# Patient Record
Sex: FEMALE | Race: WHITE | NOT HISPANIC OR LATINO | Employment: FULL TIME | URBAN - NONMETROPOLITAN AREA
[De-identification: names, ages, dates, MRNs, and addresses within clinical notes are randomized per-mention and may not be internally consistent; named-entity substitution may affect disease eponyms.]

---

## 2023-03-22 PROBLEM — Z97.3 WEARS CONTACT LENSES: Status: ACTIVE | Noted: 2023-03-22

## 2023-03-22 PROBLEM — H52.7 REFRACTIVE ERROR: Status: ACTIVE | Noted: 2023-03-22

## 2023-03-22 PROBLEM — M25.572 LEFT ANKLE PAIN: Status: ACTIVE | Noted: 2023-03-22

## 2023-03-22 PROBLEM — Z97.3 WEARS GLASSES: Status: ACTIVE | Noted: 2023-03-22

## 2023-03-22 RX ORDER — NORETHINDRONE ACETATE AND ETHINYL ESTRADIOL .03; 1.5 MG/1; MG/1
TABLET ORAL
COMMUNITY
End: 2024-03-28 | Stop reason: ALTCHOICE

## 2023-03-28 ENCOUNTER — OFFICE VISIT (OUTPATIENT)
Dept: PRIMARY CARE | Facility: CLINIC | Age: 28
End: 2023-03-28
Payer: COMMERCIAL

## 2023-03-28 VITALS
WEIGHT: 151 LBS | BODY MASS INDEX: 24.27 KG/M2 | OXYGEN SATURATION: 98 % | RESPIRATION RATE: 18 BRPM | DIASTOLIC BLOOD PRESSURE: 79 MMHG | HEIGHT: 66 IN | SYSTOLIC BLOOD PRESSURE: 124 MMHG | HEART RATE: 52 BPM

## 2023-03-28 DIAGNOSIS — Z00.00 HEALTH MAINTENANCE EXAMINATION: ICD-10-CM

## 2023-03-28 PROBLEM — M25.572 ARTHRALGIA OF LEFT ANKLE: Status: RESOLVED | Noted: 2018-06-05 | Resolved: 2023-03-28

## 2023-03-28 PROBLEM — S86.019A RUPTURE OF ACHILLES TENDON: Status: RESOLVED | Noted: 2018-06-05 | Resolved: 2023-03-28

## 2023-03-28 PROCEDURE — 99203 OFFICE O/P NEW LOW 30 MIN: CPT | Performed by: INTERNAL MEDICINE

## 2023-03-28 PROCEDURE — 1036F TOBACCO NON-USER: CPT | Performed by: INTERNAL MEDICINE

## 2023-03-28 ASSESSMENT — PATIENT HEALTH QUESTIONNAIRE - PHQ9
1. LITTLE INTEREST OR PLEASURE IN DOING THINGS: NOT AT ALL
2. FEELING DOWN, DEPRESSED OR HOPELESS: NOT AT ALL
SUM OF ALL RESPONSES TO PHQ9 QUESTIONS 1 AND 2: 0

## 2023-03-28 ASSESSMENT — PAIN SCALES - GENERAL: PAINLEVEL: 0-NO PAIN

## 2023-03-28 NOTE — PROGRESS NOTES
Subjective   Patient ID:   Joana Morales is a 27 y.o. female with no significant PMH who presents for Establish Care.    HPI  - reports that her  came and saw us then refer'ed her.   - working at [employer] full time  - , no children yet    New patient here today to establish care with myself.    Health Maintenance:  Smoking: Non Smoker  ETOH: Occasional  Illicits: Denies  Pap smear (21-65): 1/19/2022  Labs: Due  -lifts weights and plays soccer    Review of Systems   All other systems reviewed and are negative.    12 point review of systems negative unless stated above in HPI    Vitals:    03/28/23 0934   BP: 124/79   Pulse: 52   Resp: 18   SpO2: 98%     No Known Allergies     Current Outpatient Medications on File Prior to Visit   Medication Sig Dispense Refill    norethindrone ac-eth estradioL (Junel 1.5/30, 21,) 1.5-30 mg-mcg tablet tablet Junel 1.5/30 TABS   Refills: 0       Active       No current facility-administered medications on file prior to visit.      Physical Exam  Vitals reviewed.   Constitutional:       General: She is not in acute distress.     Appearance: Normal appearance. She is not ill-appearing.   HENT:      Head: Normocephalic and atraumatic.      Right Ear: Tympanic membrane and external ear normal.      Left Ear: Tympanic membrane and external ear normal.      Nose: Nose normal.      Mouth/Throat:      Mouth: Mucous membranes are moist.      Pharynx: Oropharynx is clear.   Eyes:      Conjunctiva/sclera: Conjunctivae normal.      Pupils: Pupils are equal, round, and reactive to light.   Cardiovascular:      Rate and Rhythm: Normal rate and regular rhythm.      Heart sounds: Normal heart sounds. No murmur heard.  Pulmonary:      Effort: Pulmonary effort is normal. No respiratory distress.      Breath sounds: Normal breath sounds. No wheezing.   Abdominal:      General: There is no distension.      Palpations: Abdomen is soft. There is no mass.      Tenderness: There is no abdominal  tenderness.   Musculoskeletal:         General: Normal range of motion.      Cervical back: Normal range of motion and neck supple.   Skin:     General: Skin is warm and dry.   Neurological:      General: No focal deficit present.      Mental Status: She is alert and oriented to person, place, and time.      Sensory: No sensory deficit.      Motor: No weakness.      Coordination: Coordination normal.      Gait: Gait normal.   Psychiatric:         Mood and Affect: Mood normal.         Behavior: Behavior normal.       Assessment/Plan   Diagnoses and all orders for this visit:  Health maintenance examination  Comments:  - check labs  - advised to avoid fluoroquinolones d/t h/o achilles rupture  -------------------  Written by Margarita Vee LPN, acting as a scribe for Dr. Pollock. This note accurately reflects the work and decisions made by Dr. Pollock.     I, Dr. Pollock, attest all medical record entries made by the scribe were under my direction and were personally dictated by me. I have reviewed the chart and agree that the record accurately reflects my performance of the history, physical exam, and assessment and plan.

## 2023-05-19 ENCOUNTER — LAB (OUTPATIENT)
Dept: LAB | Facility: LAB | Age: 28
End: 2023-05-19
Payer: COMMERCIAL

## 2023-05-19 DIAGNOSIS — Z00.00 HEALTH MAINTENANCE EXAMINATION: ICD-10-CM

## 2023-05-19 LAB
ALANINE AMINOTRANSFERASE (SGPT) (U/L) IN SER/PLAS: 14 U/L (ref 7–45)
ALBUMIN (G/DL) IN SER/PLAS: 4.2 G/DL (ref 3.4–5)
ALKALINE PHOSPHATASE (U/L) IN SER/PLAS: 51 U/L (ref 33–110)
ANION GAP IN SER/PLAS: 12 MMOL/L (ref 10–20)
ASPARTATE AMINOTRANSFERASE (SGOT) (U/L) IN SER/PLAS: 22 U/L (ref 9–39)
BASOPHILS (10*3/UL) IN BLOOD BY AUTOMATED COUNT: 0.02 X10E9/L (ref 0–0.1)
BASOPHILS/100 LEUKOCYTES IN BLOOD BY AUTOMATED COUNT: 0.4 % (ref 0–2)
BILIRUBIN TOTAL (MG/DL) IN SER/PLAS: 0.6 MG/DL (ref 0–1.2)
CALCIUM (MG/DL) IN SER/PLAS: 9.3 MG/DL (ref 8.6–10.3)
CARBON DIOXIDE, TOTAL (MMOL/L) IN SER/PLAS: 28 MMOL/L (ref 21–32)
CHLORIDE (MMOL/L) IN SER/PLAS: 103 MMOL/L (ref 98–107)
CHOLESTEROL (MG/DL) IN SER/PLAS: 185 MG/DL (ref 0–199)
CHOLESTEROL IN HDL (MG/DL) IN SER/PLAS: 69.3 MG/DL
CHOLESTEROL/HDL RATIO: 2.7
CREATININE (MG/DL) IN SER/PLAS: 0.89 MG/DL (ref 0.5–1.05)
EOSINOPHILS (10*3/UL) IN BLOOD BY AUTOMATED COUNT: 0.02 X10E9/L (ref 0–0.7)
EOSINOPHILS/100 LEUKOCYTES IN BLOOD BY AUTOMATED COUNT: 0.4 % (ref 0–6)
ERYTHROCYTE DISTRIBUTION WIDTH (RATIO) BY AUTOMATED COUNT: 12.2 % (ref 11.5–14.5)
ERYTHROCYTE MEAN CORPUSCULAR HEMOGLOBIN CONCENTRATION (G/DL) BY AUTOMATED: 32.4 G/DL (ref 32–36)
ERYTHROCYTE MEAN CORPUSCULAR VOLUME (FL) BY AUTOMATED COUNT: 93 FL (ref 80–100)
ERYTHROCYTES (10*6/UL) IN BLOOD BY AUTOMATED COUNT: 4.57 X10E12/L (ref 4–5.2)
GFR FEMALE: >90 ML/MIN/1.73M2
GLUCOSE (MG/DL) IN SER/PLAS: 83 MG/DL (ref 74–99)
HEMATOCRIT (%) IN BLOOD BY AUTOMATED COUNT: 42.6 % (ref 36–46)
HEMOGLOBIN (G/DL) IN BLOOD: 13.8 G/DL (ref 12–16)
IMMATURE GRANULOCYTES/100 LEUKOCYTES IN BLOOD BY AUTOMATED COUNT: 0.4 % (ref 0–0.9)
LDL: 99 MG/DL (ref 0–99)
LEUKOCYTES (10*3/UL) IN BLOOD BY AUTOMATED COUNT: 5.5 X10E9/L (ref 4.4–11.3)
LYMPHOCYTES (10*3/UL) IN BLOOD BY AUTOMATED COUNT: 1.88 X10E9/L (ref 1.2–4.8)
LYMPHOCYTES/100 LEUKOCYTES IN BLOOD BY AUTOMATED COUNT: 33.9 % (ref 13–44)
MONOCYTES (10*3/UL) IN BLOOD BY AUTOMATED COUNT: 0.42 X10E9/L (ref 0.1–1)
MONOCYTES/100 LEUKOCYTES IN BLOOD BY AUTOMATED COUNT: 7.6 % (ref 2–10)
NEUTROPHILS (10*3/UL) IN BLOOD BY AUTOMATED COUNT: 3.18 X10E9/L (ref 1.2–7.7)
NEUTROPHILS/100 LEUKOCYTES IN BLOOD BY AUTOMATED COUNT: 57.3 % (ref 40–80)
PLATELETS (10*3/UL) IN BLOOD AUTOMATED COUNT: 228 X10E9/L (ref 150–450)
POTASSIUM (MMOL/L) IN SER/PLAS: 4.3 MMOL/L (ref 3.5–5.3)
PROTEIN TOTAL: 7.3 G/DL (ref 6.4–8.2)
SODIUM (MMOL/L) IN SER/PLAS: 139 MMOL/L (ref 136–145)
THYROTROPIN (MIU/L) IN SER/PLAS BY DETECTION LIMIT <= 0.05 MIU/L: 2.27 MIU/L (ref 0.44–3.98)
TRIGLYCERIDE (MG/DL) IN SER/PLAS: 83 MG/DL (ref 0–149)
UREA NITROGEN (MG/DL) IN SER/PLAS: 14 MG/DL (ref 6–23)
VLDL: 17 MG/DL (ref 0–40)

## 2023-05-19 PROCEDURE — 80061 LIPID PANEL: CPT

## 2023-05-19 PROCEDURE — 85025 COMPLETE CBC W/AUTO DIFF WBC: CPT

## 2023-05-19 PROCEDURE — 36415 COLL VENOUS BLD VENIPUNCTURE: CPT

## 2023-05-19 PROCEDURE — 80053 COMPREHEN METABOLIC PANEL: CPT

## 2023-05-19 PROCEDURE — 84443 ASSAY THYROID STIM HORMONE: CPT

## 2023-10-03 PROCEDURE — 87651 STREP A DNA AMP PROBE: CPT

## 2023-10-04 ENCOUNTER — LAB REQUISITION (OUTPATIENT)
Dept: LAB | Facility: HOSPITAL | Age: 28
End: 2023-10-04
Payer: COMMERCIAL

## 2023-10-04 LAB — S PYO DNA THROAT QL NAA+PROBE: NOT DETECTED

## 2024-02-19 ENCOUNTER — TELEPHONE (OUTPATIENT)
Dept: OBSTETRICS AND GYNECOLOGY | Facility: CLINIC | Age: 29
End: 2024-02-19
Payer: COMMERCIAL

## 2024-02-21 ENCOUNTER — TELEPHONE (OUTPATIENT)
Dept: OBSTETRICS AND GYNECOLOGY | Facility: CLINIC | Age: 29
End: 2024-02-21
Payer: COMMERCIAL

## 2024-03-28 ENCOUNTER — OFFICE VISIT (OUTPATIENT)
Dept: OBSTETRICS AND GYNECOLOGY | Facility: CLINIC | Age: 29
End: 2024-03-28
Payer: COMMERCIAL

## 2024-03-28 VITALS
HEIGHT: 66 IN | WEIGHT: 153 LBS | DIASTOLIC BLOOD PRESSURE: 64 MMHG | SYSTOLIC BLOOD PRESSURE: 114 MMHG | BODY MASS INDEX: 24.59 KG/M2

## 2024-03-28 DIAGNOSIS — Z31.69 PRE-CONCEPTION COUNSELING: ICD-10-CM

## 2024-03-28 DIAGNOSIS — Z01.419 WELL WOMAN EXAM: Primary | ICD-10-CM

## 2024-03-28 DIAGNOSIS — Z12.4 CERVICAL CANCER SCREENING: ICD-10-CM

## 2024-03-28 PROCEDURE — 99385 PREV VISIT NEW AGE 18-39: CPT | Performed by: OBSTETRICS & GYNECOLOGY

## 2024-03-28 PROCEDURE — 1036F TOBACCO NON-USER: CPT | Performed by: OBSTETRICS & GYNECOLOGY

## 2024-03-28 PROCEDURE — 88175 CYTOPATH C/V AUTO FLUID REDO: CPT

## 2024-03-28 ASSESSMENT — ENCOUNTER SYMPTOMS
MUSCULOSKELETAL NEGATIVE: 1
CARDIOVASCULAR NEGATIVE: 1
PSYCHIATRIC NEGATIVE: 1
RESPIRATORY NEGATIVE: 1
NEUROLOGICAL NEGATIVE: 1
CONSTITUTIONAL NEGATIVE: 1
GASTROINTESTINAL NEGATIVE: 1

## 2024-03-28 NOTE — PROGRESS NOTES
"Subjective   Joana Morales is a 28 y.o. female who is here for a routine exam. Periods are regular every 28-30 days, lasting 5 days. Dysmenorrhea:none. Cyclic symptoms include headache and pelvic pain. No intermenstrual bleeding, spotting, or discharge. Patient is currently sexually active. Denies dyspareunia.    Current contraception: none  History of abnormal Pap smear: yes - atypical cells  Family history of uterine or ovarian cancer: no  Regular self breast exam: yes  History of abnormal mammogram: no  Family history of breast cancer: yes - paternal aunt  History of abnormal lipids: no  Menstrual History:  OB History          0    Para   0    Term   0       0    AB   0    Living   0         SAB   0    IAB   0    Ectopic   0    Multiple   0    Live Births   0                Menarche age: 13  Patient's last menstrual period was 2024 (exact date).         Review of Systems   Constitutional: Negative.    Respiratory: Negative.     Cardiovascular: Negative.    Gastrointestinal: Negative.    Genitourinary: Negative.    Musculoskeletal: Negative.    Neurological: Negative.    Psychiatric/Behavioral: Negative.         Objective   /64   Ht 1.676 m (5' 6\")   Wt 69.4 kg (153 lb)   LMP 2024 (Exact Date) Comment: stopped bc after getting period in feb  BMI 24.69 kg/m²     General:   alert, appears stated age, and cooperative   Heart: regular rate and rhythm, S1, S2 normal, no murmur, click, rub or gallop   Lungs: clear to auscultation bilaterally   Abdomen: soft, non-tender, without masses or organomegaly   Pelvis:  Vulva normal in appearance without discoloration, rashes, lesions. Vagina is negative for blood, discharge, lesions. Uterus is small, mobile, non tender. There is no cervical motion tenderness, adnexal masses/tenderness.   Breast: No masses, skin changes, discoloration, tenderness, or nipple drainage bilaterally    Neck: Normal ROM. Thyroid normal size. No masses/tenderness   "   Lymph: No LAD   Psych: Normal mood/affect     Assessment/Plan   Problem List Items Addressed This Visit    None  Visit Diagnoses       Well woman exam    -  Primary    Pelvic and breast exam normal  Precautions given  RTO 1 year RA    Cervical cancer screening        Pap pending 3/28    Pre-conception counseling        Advised PNV and to call with first positive home UPT to schedule pregnancy confirmation visit         Denice Christian DO

## 2024-03-28 NOTE — PATIENT INSTRUCTIONS
Routine Gynecologic Visit:  You were seen today for a routine gyn visit with normal findings on exam  You were due for a pap smear today. You should still continue to get annual breast and pelvic exams in the office.  Continue self breast exams/monitoring at home and call for appointment in the office if there are ever masses, skin discoloration, dimpling/puckering of the skin, or nipple drainage when you are not pregnant  We discussed plans for conception. Continue your prenatal vitamins. Call the office with your first positive home pregnancy test to schedule a pregnancy confirmation visit.   If you are having any gynecologic issues in the next year you should call the office. [phone] ([place]) or [phone] ([place])

## 2024-04-04 LAB
CYTOLOGY CMNT CVX/VAG CYTO-IMP: NORMAL
LAB AP HPV GENOTYPE QUESTION: YES
LAB AP HPV HR: NORMAL
LABORATORY COMMENT REPORT: NORMAL
LMP START DATE: NORMAL
PATH REPORT.TOTAL CANCER: NORMAL

## 2024-05-10 ENCOUNTER — LAB (OUTPATIENT)
Dept: LAB | Facility: LAB | Age: 29
End: 2024-05-10
Payer: COMMERCIAL

## 2024-05-10 ENCOUNTER — HOSPITAL ENCOUNTER (OUTPATIENT)
Dept: RADIOLOGY | Facility: CLINIC | Age: 29
Discharge: HOME | End: 2024-05-10
Payer: COMMERCIAL

## 2024-05-10 ENCOUNTER — OFFICE VISIT (OUTPATIENT)
Dept: PRIMARY CARE | Facility: CLINIC | Age: 29
End: 2024-05-10
Payer: COMMERCIAL

## 2024-05-10 VITALS
BODY MASS INDEX: 24.36 KG/M2 | HEART RATE: 55 BPM | RESPIRATION RATE: 18 BRPM | DIASTOLIC BLOOD PRESSURE: 65 MMHG | OXYGEN SATURATION: 97 % | WEIGHT: 151.6 LBS | SYSTOLIC BLOOD PRESSURE: 107 MMHG | HEIGHT: 66 IN

## 2024-05-10 DIAGNOSIS — Z00.00 HEALTH MAINTENANCE EXAMINATION: ICD-10-CM

## 2024-05-10 DIAGNOSIS — M54.10 BACK PAIN WITH RADICULOPATHY: ICD-10-CM

## 2024-05-10 LAB
ALBUMIN SERPL BCP-MCNC: 4.3 G/DL (ref 3.4–5)
ALP SERPL-CCNC: 65 U/L (ref 33–110)
ALT SERPL W P-5'-P-CCNC: 79 U/L (ref 7–45)
ANION GAP SERPL CALC-SCNC: 10 MMOL/L (ref 10–20)
AST SERPL W P-5'-P-CCNC: 50 U/L (ref 9–39)
BASOPHILS # BLD AUTO: 0.01 X10*3/UL (ref 0–0.1)
BASOPHILS NFR BLD AUTO: 0.2 %
BILIRUB SERPL-MCNC: 0.3 MG/DL (ref 0–1.2)
BUN SERPL-MCNC: 16 MG/DL (ref 6–23)
CALCIUM SERPL-MCNC: 9.5 MG/DL (ref 8.6–10.3)
CHLORIDE SERPL-SCNC: 103 MMOL/L (ref 98–107)
CO2 SERPL-SCNC: 31 MMOL/L (ref 21–32)
CREAT SERPL-MCNC: 0.89 MG/DL (ref 0.5–1.05)
CRP SERPL-MCNC: 0.59 MG/DL
EGFRCR SERPLBLD CKD-EPI 2021: >90 ML/MIN/1.73M*2
EOSINOPHIL # BLD AUTO: 0.04 X10*3/UL (ref 0–0.7)
EOSINOPHIL NFR BLD AUTO: 0.6 %
ERYTHROCYTE [DISTWIDTH] IN BLOOD BY AUTOMATED COUNT: 11.9 % (ref 11.5–14.5)
ERYTHROCYTE [SEDIMENTATION RATE] IN BLOOD BY WESTERGREN METHOD: 2 MM/H (ref 0–20)
GLUCOSE SERPL-MCNC: 87 MG/DL (ref 74–99)
HCT VFR BLD AUTO: 39.9 % (ref 36–46)
HGB BLD-MCNC: 13.1 G/DL (ref 12–16)
IMM GRANULOCYTES # BLD AUTO: 0.01 X10*3/UL (ref 0–0.7)
IMM GRANULOCYTES NFR BLD AUTO: 0.2 % (ref 0–0.9)
LYMPHOCYTES # BLD AUTO: 2.24 X10*3/UL (ref 1.2–4.8)
LYMPHOCYTES NFR BLD AUTO: 34.9 %
MCH RBC QN AUTO: 29.8 PG (ref 26–34)
MCHC RBC AUTO-ENTMCNC: 32.8 G/DL (ref 32–36)
MCV RBC AUTO: 91 FL (ref 80–100)
MONOCYTES # BLD AUTO: 0.51 X10*3/UL (ref 0.1–1)
MONOCYTES NFR BLD AUTO: 7.9 %
NEUTROPHILS # BLD AUTO: 3.61 X10*3/UL (ref 1.2–7.7)
NEUTROPHILS NFR BLD AUTO: 56.2 %
NRBC BLD-RTO: 0 /100 WBCS (ref 0–0)
PLATELET # BLD AUTO: 195 X10*3/UL (ref 150–450)
POTASSIUM SERPL-SCNC: 3.8 MMOL/L (ref 3.5–5.3)
PROT SERPL-MCNC: 7 G/DL (ref 6.4–8.2)
RBC # BLD AUTO: 4.39 X10*6/UL (ref 4–5.2)
SODIUM SERPL-SCNC: 140 MMOL/L (ref 136–145)
TSH SERPL-ACNC: 2.95 MIU/L (ref 0.44–3.98)
WBC # BLD AUTO: 6.4 X10*3/UL (ref 4.4–11.3)

## 2024-05-10 PROCEDURE — 72072 X-RAY EXAM THORAC SPINE 3VWS: CPT

## 2024-05-10 PROCEDURE — 86038 ANTINUCLEAR ANTIBODIES: CPT

## 2024-05-10 PROCEDURE — 99214 OFFICE O/P EST MOD 30 MIN: CPT | Performed by: INTERNAL MEDICINE

## 2024-05-10 PROCEDURE — 86140 C-REACTIVE PROTEIN: CPT

## 2024-05-10 PROCEDURE — 1036F TOBACCO NON-USER: CPT | Performed by: INTERNAL MEDICINE

## 2024-05-10 PROCEDURE — 72072 X-RAY EXAM THORAC SPINE 3VWS: CPT | Performed by: INTERNAL MEDICINE

## 2024-05-10 PROCEDURE — 84443 ASSAY THYROID STIM HORMONE: CPT

## 2024-05-10 PROCEDURE — 72110 X-RAY EXAM L-2 SPINE 4/>VWS: CPT

## 2024-05-10 PROCEDURE — 80053 COMPREHEN METABOLIC PANEL: CPT

## 2024-05-10 PROCEDURE — 82607 VITAMIN B-12: CPT

## 2024-05-10 PROCEDURE — 36415 COLL VENOUS BLD VENIPUNCTURE: CPT

## 2024-05-10 PROCEDURE — 82306 VITAMIN D 25 HYDROXY: CPT

## 2024-05-10 PROCEDURE — 86431 RHEUMATOID FACTOR QUANT: CPT

## 2024-05-10 PROCEDURE — 85025 COMPLETE CBC W/AUTO DIFF WBC: CPT

## 2024-05-10 PROCEDURE — 72110 X-RAY EXAM L-2 SPINE 4/>VWS: CPT | Performed by: INTERNAL MEDICINE

## 2024-05-10 PROCEDURE — 85652 RBC SED RATE AUTOMATED: CPT

## 2024-05-10 RX ORDER — PREDNISONE 20 MG/1
40 TABLET ORAL DAILY
Qty: 10 TABLET | Refills: 0 | Status: SHIPPED | OUTPATIENT
Start: 2024-05-10 | End: 2024-05-15

## 2024-05-10 ASSESSMENT — PAIN SCALES - GENERAL: PAINLEVEL: 2

## 2024-05-10 ASSESSMENT — PATIENT HEALTH QUESTIONNAIRE - PHQ9
2. FEELING DOWN, DEPRESSED OR HOPELESS: NOT AT ALL
1. LITTLE INTEREST OR PLEASURE IN DOING THINGS: NOT AT ALL
SUM OF ALL RESPONSES TO PHQ9 QUESTIONS 1 AND 2: 0

## 2024-05-10 ASSESSMENT — ENCOUNTER SYMPTOMS: BACK PAIN: 1

## 2024-05-10 NOTE — PATIENT INSTRUCTIONS
It was nice to see you in the office today!  As discussed during our visit...     START on prednisone 40mg daily.  ---------------------------------------------------------------------------  Please have your blood drawn in the next 1-2 weeks.   We will contact you with the results of your blood work and any necessary adjustments to your plan of care.  If you do not hear from us within 3-5 days of having your blood drawn, please call the [place] office at [phone].     The two closest Outpatient Lab's to this office is:  [address] [address]    Hours:   Monday through Friday 7:30am - 4:30pm (Closed 12:30-1pm for lunch)     Or you can go to ...  [address] [address] [phone]    Hours:   Monday through Friday 7:30am - 4:30pm (Closed 12:30-1pm for lunch)   Saturday 8am - 12pm    Website to confirm hours and location OR look up more locations ... [email or web address]

## 2024-05-10 NOTE — PROGRESS NOTES
"Patient ID:   Joana Morales is a 28 y.o. female with no significant PMH who presents to the office today for Follow-up (Low back pain, intermittant).    Last Office Visit: 3/28/2023 to establish care. She was advised to avoid fluoroquinolones d/t h/o achilles rupture. Lab work was ordered and completed.    Back Pain  This is a recurrent problem. The current episode started more than 1 month ago. The problem occurs constantly. The problem has been gradually worsening since onset. The pain is present in the lumbar spine and sacro-iliac. The quality of the pain is described as shooting and aching. The pain radiates to the right thigh and left thigh. The pain is at a severity of 4/10. The pain is moderate. The pain is The same all the time. She has tried bed rest, ice and NSAIDs for the symptoms.     Reports that she had back pain in college and had imaging at that time. Unsure of the results. Reports that she has been taking 600mg of ibuprofen daily for the pain and has tried ice.     Social History     Tobacco Use    Smoking status: Never     Passive exposure: Never    Smokeless tobacco: Never   Substance Use Topics    Alcohol use: Yes     Alcohol/week: 3.0 standard drinks of alcohol     Types: 3 Cans of beer per week     Comment: occasional      Review of Systems   Musculoskeletal:  Positive for back pain.   All other systems reviewed and are negative.    Visit Vitals  /65   Pulse 55   Resp 18   Ht 1.676 m (5' 6\")   Wt 68.8 kg (151 lb 9.6 oz)   SpO2 97%   BMI 24.47 kg/m²   OB Status Having periods   Smoking Status Never   BSA 1.79 m²     Allergies   Allergen Reactions    Chlorhexidine Rash      Physical Exam  Vitals reviewed.   Constitutional:       General: She is not in acute distress.     Appearance: Normal appearance. She is not ill-appearing.   HENT:      Head: Normocephalic and atraumatic.      Right Ear: Tympanic membrane and external ear normal.      Left Ear: Tympanic membrane and external ear " normal.      Nose: Nose normal.      Mouth/Throat:      Mouth: Mucous membranes are moist.      Pharynx: Oropharynx is clear.   Eyes:      Conjunctiva/sclera: Conjunctivae normal.      Pupils: Pupils are equal, round, and reactive to light.   Cardiovascular:      Rate and Rhythm: Normal rate and regular rhythm.      Heart sounds: Normal heart sounds. No murmur heard.  Pulmonary:      Effort: Pulmonary effort is normal. No respiratory distress.      Breath sounds: Normal breath sounds. No wheezing.   Abdominal:      General: There is no distension.      Palpations: Abdomen is soft. There is no mass.      Tenderness: There is no abdominal tenderness.   Musculoskeletal:      Cervical back: Normal range of motion and neck supple.      Thoracic back: Tenderness present. Decreased range of motion.      Lumbar back: Tenderness present. Decreased range of motion.   Skin:     General: Skin is warm and dry.   Neurological:      General: No focal deficit present.      Mental Status: She is alert and oriented to person, place, and time.      Sensory: No sensory deficit.      Motor: No weakness.      Coordination: Coordination normal.      Gait: Gait normal.   Psychiatric:         Mood and Affect: Mood normal.         Behavior: Behavior normal.       No current outpatient medications     Lab Results   Component Value Date    WBC 5.5 05/19/2023    HGB 13.8 05/19/2023    HCT 42.6 05/19/2023     05/19/2023    CHOL 185 05/19/2023    TRIG 83 05/19/2023    HDL 69.3 05/19/2023    ALT 14 05/19/2023    AST 22 05/19/2023     05/19/2023    K 4.3 05/19/2023     05/19/2023    CREATININE 0.89 05/19/2023    BUN 14 05/19/2023    CO2 28 05/19/2023    TSH 2.27 05/19/2023     Assessment/Plan   Problem List Items Addressed This Visit             ICD-10-CM    Health maintenance examination Z00.00     - will check labs         Relevant Medications    predniSONE (Deltasone) 20 mg tablet    Other Relevant Orders    CBC and Auto  Differential (Completed)    Comprehensive Metabolic Panel (Completed)    TSH with reflex to Free T4 if abnormal (Completed)    Vitamin D 25-Hydroxy,Total (for eval of Vitamin D levels) (Completed)    Vitamin B12 (Completed)    Sedimentation Rate (Completed)    C-reactive protein (Completed)    SHIKHA with Reflex to JEANIE    Rheumatoid factor (Completed)    Back pain with radiculopathy M54.10     - start on prednisone 40mg daily x5d  - take in the AM  - get annual labs AFTER completing the prednisone  - will get xray of thoracic and lumbar spine  - refer to physical therapy  - may need a MRI if no significant improvement  - rule out connective tissue disease with lab work         Relevant Medications    predniSONE (Deltasone) 20 mg tablet    Other Relevant Orders    Referral to Physical Therapy    Sedimentation Rate (Completed)    C-reactive protein (Completed)    SHIKHA with Reflex to JEANIE    Rheumatoid factor (Completed)    XR thoracic spine 3 views    XR lumbar spine complete 4+ views     ------  Written by Margarita Vee RN, acting as a scribe for Dr. Pollock. This note accurately reflects the work and decisions made by Dr. Pollock.     I, Dr. Pollock, attest all medical record entries made by the scribe were under my direction and were personally dictated by me. I have reviewed the chart and agree that the record accurately reflects my performance of the history, physical exam, and assessment and plan.

## 2024-05-11 LAB
25(OH)D3 SERPL-MCNC: 40 NG/ML (ref 30–100)
RHEUMATOID FACT SER NEPH-ACNC: <10 IU/ML (ref 0–15)
VIT B12 SERPL-MCNC: 411 PG/ML (ref 211–911)

## 2024-05-13 PROBLEM — M54.10 BACK PAIN WITH RADICULOPATHY: Status: ACTIVE | Noted: 2024-05-13

## 2024-05-13 NOTE — ASSESSMENT & PLAN NOTE
- start on prednisone 40mg daily x5d  - take in the AM  - get annual labs AFTER completing the prednisone  - will get xray of thoracic and lumbar spine  - refer to physical therapy  - may need a MRI if no significant improvement  - rule out connective tissue disease with lab work

## 2024-05-16 LAB — ANA SER QL HEP2 SUBST: NEGATIVE

## 2024-05-17 DIAGNOSIS — R79.89 LFTS ABNORMAL: Primary | ICD-10-CM

## 2024-05-28 ENCOUNTER — APPOINTMENT (OUTPATIENT)
Dept: RADIOLOGY | Facility: HOSPITAL | Age: 29
End: 2024-05-28
Payer: COMMERCIAL

## 2024-05-29 ENCOUNTER — HOSPITAL ENCOUNTER (OUTPATIENT)
Dept: RADIOLOGY | Facility: HOSPITAL | Age: 29
Discharge: HOME | End: 2024-05-29
Payer: COMMERCIAL

## 2024-05-29 DIAGNOSIS — R79.89 LFTS ABNORMAL: ICD-10-CM

## 2024-05-29 PROCEDURE — 76705 ECHO EXAM OF ABDOMEN: CPT

## 2024-05-29 PROCEDURE — 76705 ECHO EXAM OF ABDOMEN: CPT | Performed by: RADIOLOGY

## 2024-05-30 ENCOUNTER — EVALUATION (OUTPATIENT)
Dept: PHYSICAL THERAPY | Facility: CLINIC | Age: 29
End: 2024-05-30
Payer: COMMERCIAL

## 2024-05-30 DIAGNOSIS — M54.10 BACK PAIN WITH RADICULOPATHY: ICD-10-CM

## 2024-05-30 PROCEDURE — 97161 PT EVAL LOW COMPLEX 20 MIN: CPT | Mod: GP | Performed by: PHYSICAL THERAPIST

## 2024-05-30 PROCEDURE — 97014 ELECTRIC STIMULATION THERAPY: CPT | Mod: GP | Performed by: PHYSICAL THERAPIST

## 2024-05-30 PROCEDURE — 97110 THERAPEUTIC EXERCISES: CPT | Mod: GP | Performed by: PHYSICAL THERAPIST

## 2024-05-30 NOTE — PROGRESS NOTES
Physical Therapy Evaluation and Treatment    Patient Name: Joana Morales  MRN: [id number]  Evaluation Date: 5/30/2024  Time Calculation  Start Time: 1300  Stop Time: 1355  Time Calculation (min): 55 min    Current Problem:   1. Back pain with radiculopathy  Referral to Physical Therapy    Follow Up In Physical Therapy          Subjective  /General:     Patient reported hx of current condition/injury: Pt notes a long history of on and off back pain. Latest incident started about 8 wks ago and continues to bother. Seems more severe and longer lasting than previous bouts. Has not had prior PT but tried some chiropractic care with temp help only.  Precautions:  Precautions  Precautions Comment: none  Pain:  Pain Score:  (floats 0-7/10, worse with bending, sitting, standing, lying flat and 1st thing in the morning. Better with walking/mvm't.)  Pain Location:  (Across low back and into bilat hips/upper thighs when bad.)  Home Living:   Limited with bending and heavier tasks/vacuuming. Needs to sit at work all day and limited to about 45's now. Exer's regular but limited with that now.       OBJECTIVE:  Objective   Posture:   Generally good posture. Tends to slouch with sitting.  Range of Motion:   WFL trunk and BLE's. Central pain with trunk ext.  Strength:   Grossly 5/5 BLE's except bilat HS 4- and hip ext 3+. Abs grossly 4/5  Flexibility:   Poor bilat hip flexors, tight calves, HS's  Palpation:   Tender lumbar paraspinals  Special Tests:   (-) SLR, S-I comp and distraction, (+) fabers rt  Gait:  normal   Balance:   SLS 10' firm surface     Outcome Measures:  38% impairment per [place]     Treatments:  Started postural educ., posture training, IFC with heat for pain. Exer: PPT, supine sktc/hs/piriforms, prone quad and vamshi pose stretches, and prone glut sets.    HEP to be completed daily, exercises include:  All new exer's    OP EDUCATION:  Outpatient Education  Education Provided: Anatomy, Body Mechanics, Home  Exercise Program, Home Safety, Posture  Diagnosis and Precautions: lumbar strain vs early stage disc pathology  Patient Response to Education: Patient/Caregiver Verbalized Understanding of Information, Patient/Caregiver Performed Return Demonstration of Exercises/Activities, Patient/Caregiver Asked Appropriate Questions    Assessment  Rehab Prognosis: Good    Pt is a 29 y.o. Female who presents with impairments of trunk. These impairments have led to functional limitations including home, work and recreational function as well as sleep. Pt would benefit from skilled physical therapy intervention to improve above impairments and facilitate return to function.    Plan  Treatment/Interventions: Cryotherapy, Education/ Instruction, Electrical stimulation, Hot pack, Manual therapy, Self care/ home management, Therapeutic exercises, Ultrasound  PT Plan: Skilled PT  PT Frequency:  (1-2xwk)  Duration: 6-8 weeks  Number of Treatments Authorized: 60  Rehab Potential: Good  Plan of Care Agreement: Patient    Goals:  STG: Improved postural awareness          Sleep 6+ hrs          Drive 1+ hours          Tolerate 1/2 day at work without pain increase.    LTG: Sleep through the night         Drive without restrictions         Tolerate full work day         Home function at least 90%          Indep with a HEP and safe return to normal recreational    function.

## 2024-06-04 ENCOUNTER — LAB (OUTPATIENT)
Dept: LAB | Facility: LAB | Age: 29
End: 2024-06-04
Payer: COMMERCIAL

## 2024-06-04 DIAGNOSIS — R79.89 LFTS ABNORMAL: ICD-10-CM

## 2024-06-04 LAB
HAV IGM SER QL: NONREACTIVE
HBV CORE IGM SER QL: NONREACTIVE
HBV SURFACE AG SERPL QL IA: NONREACTIVE
HCV AB SER QL: NONREACTIVE

## 2024-06-04 PROCEDURE — 36415 COLL VENOUS BLD VENIPUNCTURE: CPT

## 2024-06-04 PROCEDURE — 86381 MITOCHONDRIAL ANTIBODY EACH: CPT

## 2024-06-04 PROCEDURE — 86256 FLUORESCENT ANTIBODY TITER: CPT

## 2024-06-04 PROCEDURE — 86015 ACTIN ANTIBODY EACH: CPT

## 2024-06-04 PROCEDURE — 80074 ACUTE HEPATITIS PANEL: CPT

## 2024-06-06 LAB — MITOCHONDRIA AB SER QL IF: NEGATIVE

## 2024-06-07 LAB — SMOOTH MUSCLE AB SER QL IF: ABNORMAL

## 2024-06-13 ENCOUNTER — TREATMENT (OUTPATIENT)
Dept: PHYSICAL THERAPY | Facility: CLINIC | Age: 29
End: 2024-06-13
Payer: COMMERCIAL

## 2024-06-13 DIAGNOSIS — M54.10 BACK PAIN WITH RADICULOPATHY: ICD-10-CM

## 2024-06-13 PROCEDURE — 97110 THERAPEUTIC EXERCISES: CPT | Mod: GP | Performed by: PHYSICAL THERAPIST

## 2024-06-13 PROCEDURE — 97014 ELECTRIC STIMULATION THERAPY: CPT | Mod: GP | Performed by: PHYSICAL THERAPIST

## 2024-06-13 ASSESSMENT — PAIN SCALES - GENERAL: PAINLEVEL_OUTOF10: 4

## 2024-06-13 NOTE — PROGRESS NOTES
Physical Therapy Treatment    Patient Name: Joana Morales  MRN: [id number]  Today's Date: 2024  Time Calculation  Start Time: 1445  Stop Time: 1530  Time Calculation (min): 45 min    Visit Number:  2 / 10   Visit Authorized:  10  Current Problem  1. Back pain with radiculopathy  Follow Up In Physical Therapy          Precautions  Precautions  Precautions Comment: none    Pain  Pain Score: 4    Subjective/General     Maybe a little better, definitely not worse. HEP ok.    Objective  Gait normal    Treatment:  Therapeutic Exercise  Therapeutic Exercise Activity 1: stretching/stabilization (Added partial crunch, prone knee press up, PTE with side to side, Add prabhjot, supine clam shells and standing HS/psoas/calf stretches.)    Modalities  Modality 1: Untimed ESU - Electrical Stimulation Unattended  Modality 2: Untimed Hotpack    OP EDUCATION:   All new exer's added to HEP    Assessment:  Able to progress exer program without pain increase.     End of session pain ratin/10    Plan:     Continue with current POC with the following changes , progress exer, start strength work.    Assessment of current progress against goals:  Insufficient treatment time to assess progress  Goals:   Tolerate 1/2 day at work without pain.

## 2024-06-20 ENCOUNTER — TREATMENT (OUTPATIENT)
Dept: PHYSICAL THERAPY | Facility: CLINIC | Age: 29
End: 2024-06-20
Payer: COMMERCIAL

## 2024-06-20 DIAGNOSIS — M54.10 BACK PAIN WITH RADICULOPATHY: ICD-10-CM

## 2024-06-20 PROCEDURE — 97110 THERAPEUTIC EXERCISES: CPT | Mod: GP | Performed by: PHYSICAL THERAPIST

## 2024-06-20 PROCEDURE — 97112 NEUROMUSCULAR REEDUCATION: CPT | Mod: GP | Performed by: PHYSICAL THERAPIST

## 2024-06-20 ASSESSMENT — PAIN SCALES - GENERAL: PAINLEVEL_OUTOF10: 2

## 2024-06-20 NOTE — PROGRESS NOTES
Physical Therapy Treatment    Patient Name: Joana Morales  MRN: [id number]  Today's Date: 2024  Time Calculation  Start Time: 1535  Stop Time: 1620  Time Calculation (min): 45 min    Visit Number:  3 / 10   Visit Authorized:  10    Current Problem  1. Back pain with radiculopathy  Follow Up In Physical Therapy          Precautions  Precautions  Precautions Comment: none    Pain  0-10 (Numeric) Pain Score: 2    Subjective/General     Feeling much better, minimal pain today. HEP good and helping.    Objective  Gait normal    Treatment:  Therapeutic Exercise  Therapeutic Exercise Activity 1: stretching/stabilization (started side lie clamshells and added lunge stretch)  Therapeutic Exercise Activity 2: strengthening (Started ham curls, lats and rows)  Neuromuscular exer: bodyblade straight plane, plio ball chest pass, standing bi/tri PRE and retro t-mill  Modalities  Modality 2: Untimed Hotpack (with stretching)    OP EDUCATION:   All new exer's added to HEP except body blade    Assessment:   Progressed to strengthening and standing stabilization work without pain increase.    End of session pain ratin/10    Plan:     Continue with current POC with the following changes , con't to progress exer.    Assessment of current progress against goals:  Progressing toward functional goals  Goals:   Indep with HEP

## 2024-06-25 ENCOUNTER — APPOINTMENT (OUTPATIENT)
Dept: PHYSICAL THERAPY | Facility: CLINIC | Age: 29
End: 2024-06-25
Payer: COMMERCIAL

## 2024-06-27 ENCOUNTER — TREATMENT (OUTPATIENT)
Dept: PHYSICAL THERAPY | Facility: CLINIC | Age: 29
End: 2024-06-27
Payer: COMMERCIAL

## 2024-06-27 DIAGNOSIS — M54.10 BACK PAIN WITH RADICULOPATHY: ICD-10-CM

## 2024-06-27 PROCEDURE — 97110 THERAPEUTIC EXERCISES: CPT | Mod: GP | Performed by: PHYSICAL THERAPIST

## 2024-06-27 PROCEDURE — 97014 ELECTRIC STIMULATION THERAPY: CPT | Mod: GP | Performed by: PHYSICAL THERAPIST

## 2024-06-27 PROCEDURE — 97112 NEUROMUSCULAR REEDUCATION: CPT | Mod: GP | Performed by: PHYSICAL THERAPIST

## 2024-06-27 ASSESSMENT — PAIN SCALES - GENERAL: PAINLEVEL_OUTOF10: 3

## 2024-06-27 NOTE — PROGRESS NOTES
Physical Therapy Treatment    Patient Name: Joana Morales  MRN: [id number]  Today's Date: 2024  Time Calculation  Start Time: 1530  Stop Time: 1625  Time Calculation (min): 55 min    Visit Number:  4 / 10   Visit Authorized:  10    Current Problem  1. Back pain with radiculopathy  Follow Up In Physical Therapy          Precautions  Precautions  Precautions Comment: none    Pain  0-10 (Numeric) Pain Score: 3    Subjective/General     Overall doing better but stiff and sore today. Sitting a lot bothers. HEP going well.    Objective  Gait normal    Treatment:  Therapeutic Exercise  Therapeutic Exercise Activity 1: stretching/stabilization (Added side lie Q-L/IT stretch and PRslr)  Therapeutic Exercise Activity 2: strengthening (added resisted hip add and weighted sidelie clams)    Balance/Neuromuscular Re-Education  Balance/Neuromuscular Re-Education Activity 1: standing stabilization (Started rotation with bodyblade and plioball work and added standing cat twist)    Modalities  Modality 1: Untimed ESU - Electrical Stimulation Unattended  Modality 2: Untimed Hotpack    OP EDUCATION:  Added cat twist, Q-L stretch and prslr to HEP     Assessment:  Progressed exer program today. Less pain/stiffness after session.     End of session pain ratin/10    Plan:     Continue with current POC with the following changes , progress exer program as able.    Assessment of current progress against goals:  Progressing toward functional goals  Goals:  Indep HEP

## 2024-07-09 ENCOUNTER — TREATMENT (OUTPATIENT)
Dept: PHYSICAL THERAPY | Facility: CLINIC | Age: 29
End: 2024-07-09
Payer: COMMERCIAL

## 2024-07-09 DIAGNOSIS — M54.10 BACK PAIN WITH RADICULOPATHY: ICD-10-CM

## 2024-07-09 PROCEDURE — 97110 THERAPEUTIC EXERCISES: CPT | Mod: GP | Performed by: PHYSICAL THERAPIST

## 2024-07-09 PROCEDURE — 97112 NEUROMUSCULAR REEDUCATION: CPT | Mod: GP | Performed by: PHYSICAL THERAPIST

## 2024-07-09 ASSESSMENT — PAIN SCALES - GENERAL: PAINLEVEL_OUTOF10: 2

## 2024-07-09 NOTE — PROGRESS NOTES
Physical Therapy Treatment    Patient Name: Joana Morales  MRN: [id number]  Today's Date: 2024  Time Calculation  Start Time: 0800  Stop Time: 0845  Time Calculation (min): 45 min    Visit Number:  5 / 10   Visit Authorized:  10    Current Problem  1. Back pain with radiculopathy  Follow Up In Physical Therapy          Precautions  Precautions  Precautions Comment: none    Pain  0-10 (Numeric) Pain Score: 2    Subjective/General     Feeling pretty good. Not really having much pain but still some stiffness. Starting to work back into regular exer. Program and wants to know how to proceed back to full program. Notes back exer's helping and comfortable with them.    Objective  Gait, strength and ROM all WFL's    Treatment:  Therapeutic Exercise  Therapeutic Exercise Activity 1: stretching (added standingq-L stretch)  Therapeutic Exercise Activity 2: strengthening    Balance/Neuromuscular Re-Education  Balance/Neuromuscular Re-Education Activity 1: standing stabilization (added 4 way hip)    OP EDUCATION:  Reviewed HEP and discussed with patient working back to normal programs. Also reviewed for form/technique with her exer's and discussed proper mechanics.      Assessment:  Good progress. Pt is indep with a HEP and comfortable progressing on her own. Will hold clinical care, she will call if problems develop.     End of session pain ratin/10    Plan:     Hold 21 days.  5 visit remaining    Assessment of current progress against goals:  Progressing toward functional goals  Goals:    Indep HEP

## 2024-08-06 ENCOUNTER — DOCUMENTATION (OUTPATIENT)
Dept: PHYSICAL THERAPY | Facility: CLINIC | Age: 29
End: 2024-08-06
Payer: COMMERCIAL

## 2024-08-06 NOTE — PROGRESS NOTES
Physical Therapy    Discharge Summary    Name: Joana Morales  MRN: [id number]  : 1995  Date: 24    Discharge Summary: PT    Discharge Information: Date of discharge 24, Date of evaluation 24, and Number of attended visits 5    Therapy Summary: Treatment program consisted of a progressive exer program, posture and body mechanics training with pain modalities PRN. Last seen  at which time she was feeling much better. Noted she still had some pain with pushing harder but intensity was way down and recovery better. [place] exer program was helping. Noted then she felt she could con't on her own so clinical care put on hold.    Discharge Status: No further problems have been reported. Pt. Will con't with indep HEP.     Rehab Discharge Reason: Achieved all and/or the most significant goals(s)

## 2025-02-28 ENCOUNTER — TELEPHONE (OUTPATIENT)
Dept: OBSTETRICS AND GYNECOLOGY | Facility: CLINIC | Age: 30
End: 2025-02-28
Payer: COMMERCIAL

## 2025-03-09 ENCOUNTER — OFFICE VISIT (OUTPATIENT)
Dept: URGENT CARE | Age: 30
End: 2025-03-09
Payer: COMMERCIAL

## 2025-03-09 VITALS
WEIGHT: 151 LBS | OXYGEN SATURATION: 100 % | HEART RATE: 60 BPM | TEMPERATURE: 97.5 F | BODY MASS INDEX: 24.27 KG/M2 | DIASTOLIC BLOOD PRESSURE: 75 MMHG | SYSTOLIC BLOOD PRESSURE: 113 MMHG | HEIGHT: 66 IN | RESPIRATION RATE: 16 BRPM

## 2025-03-09 DIAGNOSIS — R52 BODY ACHES: ICD-10-CM

## 2025-03-09 DIAGNOSIS — Z20.822 SUSPECTED COVID-19 VIRUS INFECTION: ICD-10-CM

## 2025-03-09 DIAGNOSIS — R68.89 FLU-LIKE SYMPTOMS: Primary | ICD-10-CM

## 2025-03-09 LAB
POC RAPID INFLUENZA A: NEGATIVE
POC RAPID INFLUENZA B: NEGATIVE
POC RAPID STREP: NEGATIVE
POC SARS-COV-2 AG BINAX: NORMAL

## 2025-03-09 PROCEDURE — 99214 OFFICE O/P EST MOD 30 MIN: CPT

## 2025-03-09 PROCEDURE — 3008F BODY MASS INDEX DOCD: CPT

## 2025-03-09 PROCEDURE — 87804 INFLUENZA ASSAY W/OPTIC: CPT

## 2025-03-09 PROCEDURE — 1036F TOBACCO NON-USER: CPT

## 2025-03-09 PROCEDURE — 87811 SARS-COV-2 COVID19 W/OPTIC: CPT

## 2025-03-09 PROCEDURE — 87880 STREP A ASSAY W/OPTIC: CPT

## 2025-03-09 RX ORDER — OSELTAMIVIR PHOSPHATE 75 MG/1
75 CAPSULE ORAL EVERY 12 HOURS
Qty: 10 CAPSULE | Refills: 0 | Status: SHIPPED | OUTPATIENT
Start: 2025-03-09 | End: 2025-03-14

## 2025-03-09 RX ORDER — NORETHINDRONE ACETATE AND ETHINYL ESTRADIOL 1.5-30(21)
1 KIT ORAL DAILY
COMMUNITY

## 2025-03-09 NOTE — PROGRESS NOTES
Subjective   Patient ID: Joana Morales is a 29 y.o. female. They present today with a chief complaint of Sore Throat (Headache, dizziness, patient is 6 weeks pregnant, body aches mostly in the joints for around a day and a half ) and Cough.    History of Present Illness  29-year-old female presents urgent care for sore throat, body aches, intermittent headache, and mild intermittent nonproductive cough.  States she also has feelings of intermittent mild lightheadedness that she been having since she started pregnancy but denies any dizziness/room spinning/focal deficits.  Denies fevers, chills, vomiting, chest pain, shortness of breath, wheezing, palpitations, abdominal pain, vision changes, ear pain, sinus pressure or pain.  States he does have some postnasal drip.  COVID, flu, strep negative.  Flu PCR pending.  Discussed patient prevalence of flu in the community and given patient's flulike symptoms discussed Tamiflu empirically and sending flu PCR.  Patient is agreeable.  Prescribed Tamiflu.  Educated on supportive care.  Follow-up with PCP and keep appointments with OB/GYN.  ER precautions.  Patient agrees with plan.          Past Medical History  Allergies as of 03/09/2025 - Reviewed 03/09/2025   Allergen Reaction Noted    Chlorhexidine Rash 09/26/2023       (Not in a hospital admission)       Past Medical History:   Diagnosis Date    Arthralgia of left ankle 06/05/2018    Personal history of other (healed) physical injury and trauma     History of eye injury    Rupture of Achilles tendon 06/05/2018       Past Surgical History:   Procedure Laterality Date    ACHILLES TENDON SURGERY      OTHER SURGICAL HISTORY  10/30/2020    No history of surgery    WISDOM TOOTH EXTRACTION  Spring of 2013        reports that she has never smoked. She has never been exposed to tobacco smoke. She has never used smokeless tobacco. She reports current alcohol use of about 3.0 standard drinks of alcohol per week. She reports that  "she does not use drugs.    Review of Systems  Review of Systems   All other systems reviewed and are negative.                                 Objective    Vitals:    03/09/25 0833   BP: 113/75   BP Location: Left arm   Patient Position: Sitting   BP Cuff Size: Adult   Pulse: 60   Resp: 16   Temp: 36.4 °C (97.5 °F)   TempSrc: Oral   SpO2: 100%   Weight: 68.5 kg (151 lb)   Height: 1.676 m (5' 6\")     Patient's last menstrual period was 02/21/2024 (exact date).    Physical Exam  Vitals reviewed.   Constitutional:       General: She is not in acute distress.     Appearance: Normal appearance. She is not ill-appearing, toxic-appearing or diaphoretic.   HENT:      Head: Normocephalic and atraumatic.      Comments: No sinus tenderness.     Right Ear: Tympanic membrane, ear canal and external ear normal.      Left Ear: Tympanic membrane, ear canal and external ear normal.      Nose: Nose normal.      Mouth/Throat:      Mouth: Mucous membranes are moist.      Pharynx: Oropharynx is clear.      Comments: Pharynx and tonsils mildly erythematous without exudate, uvula midline and normal, airway clear.  Small amount of postnasal drip.  Eyes:      Extraocular Movements: Extraocular movements intact.      Pupils: Pupils are equal, round, and reactive to light.   Cardiovascular:      Rate and Rhythm: Normal rate and regular rhythm.   Pulmonary:      Effort: Pulmonary effort is normal. No respiratory distress.      Breath sounds: Normal breath sounds. No stridor. No wheezing, rhonchi or rales.   Abdominal:      General: Abdomen is flat.      Palpations: Abdomen is soft.      Tenderness: There is no abdominal tenderness. There is no right CVA tenderness or left CVA tenderness.   Musculoskeletal:      Cervical back: Normal range of motion and neck supple. No rigidity or tenderness.   Lymphadenopathy:      Cervical: No cervical adenopathy.   Skin:     General: Skin is warm and dry.   Neurological:      General: No focal deficit " present.      Mental Status: She is alert and oriented to person, place, and time.      Motor: No weakness.      Gait: Gait normal.   Psychiatric:         Mood and Affect: Mood normal.         Behavior: Behavior normal.         Procedures    Point of Care Test & Imaging Results from this visit  No results found for this visit on 03/09/25.   No results found.    Diagnostic study results (if any) were reviewed by Evaristo Garay PA-C.    Assessment/Plan   Allergies, medications, history, and pertinent labs/EKGs/Imaging reviewed by Evaristo Garay PA-C.     Medical Decision Making  29-year-old female presents urgent care for sore throat, body aches, intermittent headache, and mild intermittent nonproductive cough.  States she also has feelings of intermittent mild lightheadedness that she been having since she started pregnancy but denies any dizziness/room spinning/focal deficits.  Denies fevers, chills, vomiting, chest pain, shortness of breath, wheezing, palpitations, abdominal pain, vision changes, ear pain, sinus pressure or pain.  States he does have some postnasal drip.  COVID, flu, strep negative.  Flu PCR pending.  Discussed patient prevalence of flu in the community and given patient's flulike symptoms discussed Tamiflu empirically and sending flu PCR.  Patient is agreeable.  Prescribed Tamiflu.  Educated on supportive care.  Follow-up with PCP and keep appointments with OB/GYN.  ER precautions.  Patient agrees with plan.    Orders and Diagnoses  There are no diagnoses linked to this encounter.    Medical Admin Record      Patient disposition: Home    Electronically signed by Evaristo Garay PA-C  8:37 AM       [facility]

## 2025-03-09 NOTE — PATIENT INSTRUCTIONS
Take medications as prescribed.  Maintain adequate hydration and nutrition.  Take Tylenol following dosing instructions as needed for body aches.  If symptoms worsen, do not improve, or any other concerning or worrisome symptoms develop please return to the clinic or go to the emergency department.  Keep your follow-up appointments with your OB/GYN.  Follow-up with PCP in 1 to 2 weeks.

## 2025-03-12 LAB
FLUAV RNA SPEC QL NAA+PROBE: NOT DETECTED
FLUBV RNA SPEC QL NAA+PROBE: NOT DETECTED
SPECIMEN SOURCE: NORMAL

## 2025-03-13 ENCOUNTER — TELEPHONE (OUTPATIENT)
Dept: OBSTETRICS AND GYNECOLOGY | Facility: CLINIC | Age: 30
End: 2025-03-13
Payer: COMMERCIAL

## 2025-03-26 ENCOUNTER — APPOINTMENT (OUTPATIENT)
Dept: OBSTETRICS AND GYNECOLOGY | Facility: CLINIC | Age: 30
End: 2025-03-26
Payer: COMMERCIAL

## 2025-03-26 VITALS — BODY MASS INDEX: 25.02 KG/M2 | WEIGHT: 155 LBS | DIASTOLIC BLOOD PRESSURE: 70 MMHG | SYSTOLIC BLOOD PRESSURE: 122 MMHG

## 2025-03-26 DIAGNOSIS — O21.9 NAUSEA AND VOMITING IN PREGNANCY PRIOR TO 22 WEEKS GESTATION: ICD-10-CM

## 2025-03-26 DIAGNOSIS — Z32.01 PREGNANCY TEST POSITIVE (HHS-HCC): Primary | ICD-10-CM

## 2025-03-26 PROBLEM — M25.572 LEFT ANKLE PAIN: Status: RESOLVED | Noted: 2023-03-22 | Resolved: 2025-03-26

## 2025-03-26 LAB — PREGNANCY TEST URINE, POC: POSITIVE

## 2025-03-26 PROCEDURE — 0500F INITIAL PRENATAL CARE VISIT: CPT | Performed by: OBSTETRICS & GYNECOLOGY

## 2025-03-26 PROCEDURE — 81025 URINE PREGNANCY TEST: CPT | Performed by: OBSTETRICS & GYNECOLOGY

## 2025-03-26 PROCEDURE — 76817 TRANSVAGINAL US OBSTETRIC: CPT | Performed by: OBSTETRICS & GYNECOLOGY

## 2025-03-26 RX ORDER — DOXYLAMINE SUCCINATE AND PYRIDOXINE HYDROCHLORIDE, DELAYED RELEASE TABLETS 10 MG/10 MG 10; 10 MG/1; MG/1
1 TABLET, DELAYED RELEASE ORAL 2 TIMES DAILY
Qty: 60 TABLET | Refills: 11 | Status: SHIPPED | OUTPATIENT
Start: 2025-03-26

## 2025-03-26 ASSESSMENT — ENCOUNTER SYMPTOMS
MUSCULOSKELETAL NEGATIVE: 1
RESPIRATORY NEGATIVE: 1
GASTROINTESTINAL NEGATIVE: 1
PSYCHIATRIC NEGATIVE: 1
CONSTITUTIONAL NEGATIVE: 1
CARDIOVASCULAR NEGATIVE: 1
NEUROLOGICAL NEGATIVE: 1

## 2025-03-26 NOTE — PROGRESS NOTES
Subjective   Patient ID: Joana Morales is a 29 y.o. female who presents for Pregnancy Ultrasound.  HPI    Patient presents for pregnancy confirmation. LMP 1/24/25. Reports positive home pregnancy test 2/28/25. Denies bleeding, pelvic pain, change in discharge. Notes moderate nausea.    Review of Systems   Constitutional: Negative.    Respiratory: Negative.     Cardiovascular: Negative.    Gastrointestinal: Negative.    Genitourinary: Negative.    Musculoskeletal: Negative.    Skin: Negative.    Neurological: Negative.    Psychiatric/Behavioral: Negative.         Objective   Visit Vitals  /70   Wt 70.3 kg (155 lb)   LMP 01/24/2025 Comment: stopped bc after getting period in feb   BMI 25.02 kg/m²   OB Status Pregnant   Smoking Status Never   BSA 1.81 m²       Physical Exam  Constitutional:       Appearance: Normal appearance.   Pulmonary:      Effort: Pulmonary effort is normal.   Genitourinary:     Comments: Vulva normal in appearance without discoloration, rashes, lesions. Vagina is negative for blood, discharge, lesions. Uterus is small, mobile, non tender. There is no cervical motion tenderness, adnexal masses/tenderness.     Neurological:      Mental Status: She is alert.   Psychiatric:         Mood and Affect: Mood normal.         Behavior: Behavior normal.       Assessment/Plan   Problem List Items Addressed This Visit    None  Visit Diagnoses         Codes    Pregnancy test positive ([facility])    -  Primary Z32.01    Relevant Orders    POCT pregnancy, urine manually resulted (Completed)    US OB transvaginal (Completed)    Prenatal Screening Panel    Hemoglobin A1C    Type And Screen Is this order related to pregnancy or an upcoming surgery? Yes; Where will this surgery/delivery [facility] performed? [facility]; What is the date of the surgery? 11/10/2025; Has this patient ever had a transfusion? No; Has thi...    Myriad Prequel Prenatal Screen    [facility] Foresight Carrier Screen    Nausea and  vomiting in pregnancy prior to 22 weeks gestation     O21.9    Relevant Medications    doxylamine-pyridoxine, vit B6, 10-10 mg tablet,delayed release (/EC)          Discussed findings on exam and ultrasound  Muhammad IUP confirmed, ALL 11/10/25  PNL and genetic screening ordered  PNV  Precautions given  RTO 4 weeks ENOB         Denice Christian, DO 03/26/25 1:46 PM

## 2025-03-26 NOTE — PATIENT INSTRUCTIONS
You were seen in the office today for confirmation of pregnancy. Your baby measured 7w2d on ultrasound and your due date is 11/10/25  Continue routine OB precautions at home  Continue taking prenatal vitamins. If you have not started prenatal vitamins you can start them now. Chewable and gummy prenatal vitamins are fine if it is difficult for you to swallow pills. Prenatal vitamins should have at least 800 mcg of Folic Acid to reduce the risk of neural tube/spinal defects in the baby. If you are currently greater than 10 weeks gestation, you should start two 81 mg Aspirin (baby Aspirin) daily to reduce the risk of elevated blood pressure/preeclampsia in the third trimester.   Routine prenatal lab work was ordered for you today and needs to be done prior to your next visit. These can be done at any  outpatient laboratory without an appointment, and do not require fasting.  Optional lab work to screen for genetic disorders can also be ordered. If these were discussed and/or ordered today the results will come separately from the rest of your routine labs, generally within 7-9 business days, and we will call you with these results. If you are still considering these labs we are happy to provide more information for review at home, and can answer any additional questions/order at the next office visit.  Avoid sick contacts and consider getting your Flu (available in office during flu season) and COVID vaccines to protect against infection in pregnancy  Make an appointment for a New OB visit in the office in the next 3-4 weeks  If you are having any concerns prior to your next visit please call the office to speak to the physician on call. This includes after hours, weekends, and holidays, when the answering service will be able to connect you with the physician on call. [phone] ([place] Office) or [phone] [facility].

## 2025-04-14 DIAGNOSIS — Z32.01 PREGNANCY TEST POSITIVE (HHS-HCC): ICD-10-CM

## 2025-04-18 ENCOUNTER — LAB (OUTPATIENT)
Dept: LAB | Facility: HOSPITAL | Age: 30
End: 2025-04-18
Payer: COMMERCIAL

## 2025-04-18 LAB
ABO GROUP (TYPE) IN BLOOD: NORMAL
ANTIBODY SCREEN: NORMAL
ERYTHROCYTE [DISTWIDTH] IN BLOOD BY AUTOMATED COUNT: 12.8 % (ref 11.5–14.5)
HBV SURFACE AG SERPL QL IA: NONREACTIVE
HCT VFR BLD AUTO: 39.5 % (ref 36–46)
HCV AB SER QL: NONREACTIVE
HGB BLD-MCNC: 13.3 G/DL (ref 12–16)
HIV 1+2 AB+HIV1 P24 AG SERPL QL IA: NONREACTIVE
MCH RBC QN AUTO: 30.9 PG (ref 26–34)
MCHC RBC AUTO-ENTMCNC: 33.7 G/DL (ref 32–36)
MCV RBC AUTO: 92 FL (ref 80–100)
NRBC BLD-RTO: 0 /100 WBCS (ref 0–0)
PLATELET # BLD AUTO: 178 X10*3/UL (ref 150–450)
RBC # BLD AUTO: 4.3 X10*6/UL (ref 4–5.2)
RH FACTOR (ANTIGEN D): NORMAL
WBC # BLD AUTO: 8.3 X10*3/UL (ref 4.4–11.3)

## 2025-04-18 PROCEDURE — 86901 BLOOD TYPING SEROLOGIC RH(D): CPT

## 2025-04-18 PROCEDURE — 87340 HEPATITIS B SURFACE AG IA: CPT

## 2025-04-18 PROCEDURE — 86780 TREPONEMA PALLIDUM: CPT

## 2025-04-18 PROCEDURE — 86803 HEPATITIS C AB TEST: CPT

## 2025-04-18 PROCEDURE — 85027 COMPLETE CBC AUTOMATED: CPT

## 2025-04-18 PROCEDURE — 86900 BLOOD TYPING SEROLOGIC ABO: CPT

## 2025-04-18 PROCEDURE — 86762 RUBELLA ANTIBODY: CPT

## 2025-04-18 PROCEDURE — 87389 HIV-1 AG W/HIV-1&-2 AB AG IA: CPT

## 2025-04-18 PROCEDURE — 86850 RBC ANTIBODY SCREEN: CPT

## 2025-04-19 LAB
EST. AVERAGE GLUCOSE BLD GHB EST-MCNC: 108 MG/DL
EST. AVERAGE GLUCOSE BLD GHB EST-SCNC: 6 MMOL/L
HBA1C MFR BLD: 5.4 %
REFLEX ADDED, ANEMIA PANEL: NORMAL
RUBV IGG SERPL IA-ACNC: 0.5 IA
RUBV IGG SERPL QL IA: NEGATIVE
TREPONEMA PALLIDUM IGG+IGM AB [PRESENCE] IN SERUM OR PLASMA BY IMMUNOASSAY: NONREACTIVE

## 2025-04-21 PROBLEM — Z34.00 SUPERVISION OF NORMAL FIRST PREGNANCY, ANTEPARTUM (HHS-HCC): Status: ACTIVE | Noted: 2025-04-21

## 2025-04-21 PROBLEM — Z97.3 WEARS CONTACT LENSES: Status: RESOLVED | Noted: 2023-03-22 | Resolved: 2025-04-21

## 2025-04-21 PROBLEM — Z28.39 RUBELLA NON-IMMUNE STATUS, ANTEPARTUM (HHS-HCC): Status: ACTIVE | Noted: 2025-04-21

## 2025-04-21 PROBLEM — Z00.00 HEALTH MAINTENANCE EXAMINATION: Status: RESOLVED | Noted: 2023-03-28 | Resolved: 2025-04-21

## 2025-04-21 PROBLEM — M54.10 BACK PAIN WITH RADICULOPATHY: Status: RESOLVED | Noted: 2024-05-13 | Resolved: 2025-04-21

## 2025-04-21 PROBLEM — Z97.3 WEARS GLASSES: Status: RESOLVED | Noted: 2023-03-22 | Resolved: 2025-04-21

## 2025-04-21 PROBLEM — H52.7 REFRACTIVE ERROR: Status: RESOLVED | Noted: 2023-03-22 | Resolved: 2025-04-21

## 2025-04-21 PROBLEM — O09.899 RUBELLA NON-IMMUNE STATUS, ANTEPARTUM (HHS-HCC): Status: ACTIVE | Noted: 2025-04-21

## 2025-04-23 ENCOUNTER — APPOINTMENT (OUTPATIENT)
Dept: OBSTETRICS AND GYNECOLOGY | Facility: CLINIC | Age: 30
End: 2025-04-23
Payer: COMMERCIAL

## 2025-04-23 VITALS — DIASTOLIC BLOOD PRESSURE: 70 MMHG | BODY MASS INDEX: 25.02 KG/M2 | WEIGHT: 155 LBS | SYSTOLIC BLOOD PRESSURE: 116 MMHG

## 2025-04-23 DIAGNOSIS — Z34.00 SUPERVISION OF NORMAL FIRST PREGNANCY, ANTEPARTUM (HHS-HCC): ICD-10-CM

## 2025-04-23 PROCEDURE — 0501F PRENATAL FLOW SHEET: CPT | Performed by: NURSE PRACTITIONER

## 2025-04-23 NOTE — PROGRESS NOTES
Joana Morales is a  at 11w2d with a working estimated date of delivery of 11/10/2025, by Ultrasound who presents for a routine prenatal visit.   She is feeling well. Some nausea, taking Unisom and Vitamin B6 which is helping. Denies vaginal bleeding.     Prenatal labs completed. Rubella non-immune otherwise normal. Blood type B+  Pap is up to date. Last pap 2024 negative.   NG/CT, Ucx collected today  Genetic carrier screening and NIPT results pending. Discussed option for first trimester US, patient declines.   Vaccine recommendations reviewed.   Aspirin prophylaxis discussed starting between 12-16 weeks.      Objective   Physical Exam  Weight: 70.3 kg (155 lb), Pregravid BMI: 25.03  Expected Total Weight Gain: 7 kg (15 lb)-11.5 kg (25 lb)   BP: 116/70  Fetal Heart Rate:  (+US)      Imaging  S=D, +FCA and +FM    Problem List Items Addressed This Visit          Ob-Gyn Problems    Supervision of normal first pregnancy, antepartum ([facility])    Overview       1st Trimester  [x]  OB profile/lab work 25 WNL, RUBELLA NON IMMUNE, BLOOD TYPE B+  [x]  Pap 3-28-24 NEG  []  GC DNA probe Collected   []  Urine culture Collected   [x]  Early A1c (BMI 30+) 25 5.4%  []  Aneuploidy screening (Prequel 10+ wk/First Trimester Check 11-14 wk) 25 PREQUEL  []  Carrier screening 25 FORESIGHT  []  First trimester anatomy US/NT DECLINES  []  Aspirin prophylaxis Discussed     2nd Trimester  []  Anatomy US (19-21 wks)  []  1 hour Glucose (25-28 wks)  []  CBC (25-28 wks)  []  3 hour GTT (if needed)    3rd Trimester  []  GBS (36-37 wks)  []  L&D consent  []  TOLAC consent (if needed)  []  Medicaid salpingectomy consent    Vaccines  []  COVID  []  Flu (September to May)  []  Tdap (27-36 wks)  []  RSV (32-36 wks Sept-)             Relevant Orders    Urine culture    C. trachomatis / N. gonorrhoeae, Amplified, Urogenital        Patient oriented to practice including shared OB call and how to  reach physician on call  NG/CT and urine cultures collected  Follow up in 4 weeks for a routine prenatal visit or sooner if needed.

## 2025-04-24 LAB
C TRACH RRNA SPEC QL NAA+PROBE: NOT DETECTED
N GONORRHOEA RRNA SPEC QL NAA+PROBE: NOT DETECTED
QUEST GC CT AMPLIFIED (ALWAYS MESSAGE): NORMAL

## 2025-04-25 LAB — BACTERIA UR CULT: NORMAL

## 2025-05-03 LAB
COMMENTS - MP RESULT TYPE: NORMAL
SCAN RESULT: NORMAL

## 2025-05-09 LAB
COMMENTS - MP RESULT TYPE: NORMAL
SCAN RESULT: NORMAL

## 2025-05-21 ENCOUNTER — APPOINTMENT (OUTPATIENT)
Dept: OBSTETRICS AND GYNECOLOGY | Facility: CLINIC | Age: 30
End: 2025-05-21
Payer: COMMERCIAL

## 2025-05-21 ENCOUNTER — TELEPHONE (OUTPATIENT)
Dept: OBSTETRICS AND GYNECOLOGY | Facility: CLINIC | Age: 30
End: 2025-05-21

## 2025-05-21 VITALS — WEIGHT: 161 LBS | DIASTOLIC BLOOD PRESSURE: 72 MMHG | SYSTOLIC BLOOD PRESSURE: 114 MMHG | BODY MASS INDEX: 25.99 KG/M2

## 2025-05-21 DIAGNOSIS — Z36.3 SCREENING, ANTENATAL, FOR MALFORMATION BY ULTRASOUND: ICD-10-CM

## 2025-05-21 DIAGNOSIS — Z3A.15 15 WEEKS GESTATION OF PREGNANCY (HHS-HCC): Primary | ICD-10-CM

## 2025-05-21 LAB
POC BLOOD, URINE: NEGATIVE
POC GLUCOSE, URINE: NEGATIVE MG/DL
POC KETONES, URINE: NEGATIVE MG/DL
POC LEUKOCYTES, URINE: NEGATIVE
POC NITRITE,URINE: NEGATIVE
POC PROTEIN, URINE: NEGATIVE MG/DL

## 2025-05-21 PROCEDURE — 0501F PRENATAL FLOW SHEET: CPT | Performed by: OBSTETRICS & GYNECOLOGY

## 2025-05-21 NOTE — PATIENT INSTRUCTIONS
You were seen in the office today for routine OB care and had normal findings on exam  Continue routine OB precautions at home  Continue taking prenatal vitamins   Avoid sick contacts and consider getting your Flu (available in office during flu season) and COVID vaccines to protect against infection in pregnancy  You will be given an order for your anatomy ultrasound. Please schedule at [facility] OB imaging between 19 and 22 weeks gestation [phone]  You will be given a bottle of Glucola and orders for your second trimester labs. Please complete these between 25 and 28 weeks gestation. You may complete these at any  outpatient lab, and do not need an appointment  Make an appointment for routine care in the office in the next 4 weeks  If you are having any concerns prior to your next visit please call the office to speak to the physician on call. This includes after hours, weekends, and holidays, when the answering service will be able to connect you with the physician on call. [phone] ([place] Office) or [phone] [facility].

## 2025-05-21 NOTE — PROGRESS NOTES
Subjective   Joana Morales is a 30 y.o.  at 15w2d, presents for a routine prenatal visit.   No fetal movement yet.     Objective     Visit Vitals  /72   Wt 73 kg (161 lb)   LMP 2025 Comment: stopped bc after getting period in feb   BMI 25.99 kg/m²   OB Status Pregnant   Smoking Status Never   BSA 1.84 m²       Lab Results   Component Value Date    HGB 13.3 2025    HCT 39.5 2025     Problem List Items Addressed This Visit       15 weeks gestation of pregnancy ([facility]) - Primary    Overview       1st Trimester  [x]  OB profile/lab work 25 WNL, RUBELLA NON IMMUNE, BLOOD TYPE B+  [x]  Pap 3-28-24 NEG  [x]  GC DNA probe 25 NEG/NEG  [x]  Urine culture 25 NEG  [x]  Early A1c (BMI 30+) 25 5.4%  [x]  Aneuploidy screening (Prequel 10+ wk/First Trimester Check 11-14 wk) 25 PREQUEL RRNIPT  [x]  Carrier screening 25 FORESIGHT NEG X3  []  First trimester anatomy US/NT DECLINES  []  Aspirin prophylaxis Discussed     2nd Trimester  []  Anatomy US (19-21 wks)  []  1 hour Glucose (25-28 wks)  []  CBC (25-28 wks)  []  3 hour GTT (if needed)    3rd Trimester  []  GBS (36-37 wks)  []  L&D consent  []  TOLAC consent (if needed)  []  Medicaid salpingectomy consent    Vaccines  []  COVID  []  Flu (September to May)  []  Tdap (27-36 wks)  []  RSV (32-36 wks Sept-)             Relevant Orders    POCT UA Automated manually resulted (Completed)     Other Visit Diagnoses         Screening, , for malformation by ultrasound        Relevant Orders    US MAC OB imaging order           Plan    Requisition placed for scheduling 19-20 week anatomy ultrasound.   RTO 4 weeks.     Briseida Baltazar MD

## 2025-06-16 ENCOUNTER — APPOINTMENT (OUTPATIENT)
Dept: OBSTETRICS AND GYNECOLOGY | Facility: CLINIC | Age: 30
End: 2025-06-16
Payer: COMMERCIAL

## 2025-06-16 VITALS — BODY MASS INDEX: 26.95 KG/M2 | DIASTOLIC BLOOD PRESSURE: 60 MMHG | WEIGHT: 167 LBS | SYSTOLIC BLOOD PRESSURE: 120 MMHG

## 2025-06-16 DIAGNOSIS — Z3A.19 19 WEEKS GESTATION OF PREGNANCY (HHS-HCC): Primary | ICD-10-CM

## 2025-06-16 LAB
POC GLUCOSE, URINE: NEGATIVE MG/DL
POC KETONES, URINE: ABNORMAL MG/DL
POC PROTEIN, URINE: ABNORMAL MG/DL
POC SPECIFIC GRAVITY, URINE: 1.02

## 2025-06-16 PROCEDURE — 0501F PRENATAL FLOW SHEET: CPT | Performed by: OBSTETRICS & GYNECOLOGY

## 2025-06-16 NOTE — PROGRESS NOTES
Ob Visit  25     SUBJECTIVE    HPI: Joana Morales is a 30 y.o.  at 19w0d here for RPNV.       Feeling well, no concerns.  Questions about pain management in labor, cord cutting and other delivery plans.  No abdominal pain.  No bleeding.      OBJECTIVE  Visit Vitals  /60   Wt 75.8 kg (167 lb)   LMP 2025 Comment: stopped bc after getting period in feb   BMI 26.95 kg/m²   OB Status Pregnant   Smoking Status Never   BSA 1.88 m²        Lab Results   Component Value Date    HGB 13.3 2025    HCT 39.5 2025       Physical Exam:   Weight: 75.8 kg (167 lb)  Expected Total Weight Gain: 7 kg (15 lb)-11.5 kg (25 lb)   Pregravid BMI: 25.03  BP: 120/60  Fetal Heart Rate: 150 Fundal Height (cm): 20 cm               ASSESSMENT & PLAN    Joana Morales is a 30 y.o.  at 19w0d here for the following concerns we addressed today:    Problem List Items Addressed This Visit       19 weeks gestation of pregnancy ([facility])    Overview   29 yo     1st Trimester  [x]  OB profile/lab work 25 WNL, RUBELLA NON IMMUNE, BLOOD TYPE B+  [x]  Pap 3-28-24 NEG  [x]  GC DNA probe 25 NEG/NEG  [x]  Urine culture 25 NEG  [x]  Early A1c (BMI 30+) 25 5.4%  [x]  Aneuploidy screening (Prequel 10+ wk/First Trimester Check 11-14 wk) 25 PREQUEL RRNIPT  [x]  Carrier screening 25 FORESIGHT NEG X3  []  First trimester anatomy US/NT DECLINES  []  Aspirin prophylaxis Discussed     2nd Trimester  []  Anatomy US (19-21 wks)  []  1 hour Glucose (25-28 wks)  []  CBC (25-28 wks)  []  3 hour GTT (if needed)    3rd Trimester  []  GBS (36-37 wks)  []  L&D consent  []  TOLAC consent (if needed)  []  Medicaid salpingectomy consent    Vaccines  []  COVID  []  Flu (September to May)  []  Tdap (27-36 wks)  []  RSV (32-36 wks Sept-)             Relevant Orders    POCT UA (nonautomated) manually resulted (Completed)           Doing well today.  Reviewed delivery preferences.  Suggested birth  classes, gave her a sample ACOG birth plan to look over.  Precautions reviewed. Advised her to call for any new problems.    RTC in 4 weeks      Ana Jarrett MD

## 2025-06-23 ENCOUNTER — HOSPITAL ENCOUNTER (OUTPATIENT)
Dept: RADIOLOGY | Facility: CLINIC | Age: 30
Discharge: HOME | End: 2025-06-23
Payer: COMMERCIAL

## 2025-06-23 DIAGNOSIS — Z36.3 SCREENING, ANTENATAL, FOR MALFORMATION BY ULTRASOUND: ICD-10-CM

## 2025-06-23 PROCEDURE — 76805 OB US >/= 14 WKS SNGL FETUS: CPT

## 2025-06-23 PROCEDURE — 76805 OB US >/= 14 WKS SNGL FETUS: CPT | Performed by: OBSTETRICS & GYNECOLOGY

## 2025-07-10 ENCOUNTER — HOSPITAL ENCOUNTER (OUTPATIENT)
Dept: RADIOLOGY | Facility: CLINIC | Age: 30
Discharge: HOME | End: 2025-07-10
Payer: COMMERCIAL

## 2025-07-10 DIAGNOSIS — Z36.3 SCREENING, ANTENATAL, FOR MALFORMATION BY ULTRASOUND: ICD-10-CM

## 2025-07-10 DIAGNOSIS — Z36.2 ENCOUNTER FOR FOLLOW-UP ULTRASOUND OF FETAL ANATOMY: ICD-10-CM

## 2025-07-10 PROCEDURE — 76816 OB US FOLLOW-UP PER FETUS: CPT

## 2025-07-10 PROCEDURE — 76815 OB US LIMITED FETUS(S): CPT

## 2025-07-14 ENCOUNTER — APPOINTMENT (OUTPATIENT)
Dept: OBSTETRICS AND GYNECOLOGY | Facility: CLINIC | Age: 30
End: 2025-07-14
Payer: COMMERCIAL

## 2025-07-14 VITALS — BODY MASS INDEX: 27.76 KG/M2 | DIASTOLIC BLOOD PRESSURE: 78 MMHG | SYSTOLIC BLOOD PRESSURE: 116 MMHG | WEIGHT: 172 LBS

## 2025-07-14 DIAGNOSIS — Z34.00 SUPERVISION OF NORMAL FIRST PREGNANCY, ANTEPARTUM (HHS-HCC): ICD-10-CM

## 2025-07-14 DIAGNOSIS — O09.899 RUBELLA NON-IMMUNE STATUS, ANTEPARTUM (HHS-HCC): ICD-10-CM

## 2025-07-14 DIAGNOSIS — Z28.39 RUBELLA NON-IMMUNE STATUS, ANTEPARTUM (HHS-HCC): ICD-10-CM

## 2025-07-14 DIAGNOSIS — Z3A.23 23 WEEKS GESTATION OF PREGNANCY (HHS-HCC): Primary | ICD-10-CM

## 2025-07-14 PROCEDURE — 0501F PRENATAL FLOW SHEET: CPT | Performed by: OBSTETRICS & GYNECOLOGY

## 2025-07-14 NOTE — PROGRESS NOTES
Joana Morales is a 30 y.o.  at 23w0d here for RHONA.    +FM. No ctx, VB or LOF.  Reports headaches.    Her pregnancy is complicated by:  Uncomplicated.    OBJECTIVE    Physical Exam:   Weight: 78 kg (172 lb)  Expected Total Weight Gain: 7 kg (15 lb)-11.5 kg (25 lb)   Pregravid BMI: 25.03  BP: 116/78                  ASSESSMENT & PLAN    Problem List Items Addressed This Visit          Gravid and     23 weeks gestation of pregnancy ([facility]) - Primary    Overview   31 yo     1st Trimester  [x]  OB profile/lab work 25 WNL, RUBELLA NON IMMUNE, BLOOD TYPE B+  [x]  Pap 3-28-24 NEG  [x]  GC DNA probe 25 NEG/NEG  [x]  Urine culture 25 NEG  [x]  Early A1c (BMI 30+) 25 5.4%  [x]  Aneuploidy screening (Prequel 10+ wk/First Trimester Check 11-14 wk) 25 PREQUEL RRNIPT  [x]  Carrier screening 25 FORESIGHT NEG X3  []  First trimester anatomy US/NT DECLINES  []  Aspirin prophylaxis Discussed     2nd Trimester  [x]  Anatomy US (19-21 wks) 2025 US 20 4/7 wks S=D, posterior placenta, Incomplete anatomic survey, recommend repeat.  []  1 hour Glucose (25-28 wks)  []  CBC (25-28 wks)  []  3 hour GTT (if needed)    3rd Trimester  []  GBS (36-37 wks)  []  L&D consent  []  TOLAC consent (if needed)  []  Medicaid salpingectomy consent    Vaccines  []  COVID  []  Flu (September to May)  []  Tdap (27-36 wks)  []  RSV (32-36 wks Sept-)             Rubella non-immune status, antepartum ([facility])    Overview   MMR postpartum          Other Visit Diagnoses         Supervision of normal first pregnancy, antepartum ([facility])        Relevant Orders    CBC Anemia Panel With Reflex,Pregnancy    Glucose, 1 Hour Screen, Pregnancy          CBC and GTT ordered today.  Counseled about Tdap vaccine.   RTC in 3 weeks for routine prenatal visit.      Scribe Attestation  By signing my name below, I, Abby Banks, Scribe   attest that this documentation has been prepared under the direction and in  the presence of Dmitry Riggs MD.

## 2025-07-30 PROBLEM — Z3A.26 26 WEEKS GESTATION OF PREGNANCY (HHS-HCC): Status: ACTIVE | Noted: 2025-04-21

## 2025-07-31 NOTE — PROGRESS NOTES
Ob Visit  25     SUBJECTIVE      HPI: Joana Morales is a 30 y.o.  at 26w0d here for RPNV.    She has no contractions, no bleeding, or no LOF.   Reports normal fetal movement.   Patient did glucola this morning.  Had sciatica in the past and on the left now. Has done PT. Would like to see a chiropractor.    Her pregnancy is complicated by:  Rubella nonimmune       OBJECTIVE  Objective   Physical Exam  Weight: 79.4 kg (175 lb)  BP: 118/76  Fetal Heart Rate: 150 Fundal Height (cm): 26 cm    Lab Results   Component Value Date    HGB 12.0 2025    HCT 36.5 2025         ASSESSMENT & PLAN    Joana Morales is a 30 y.o.  at 26w0d here for the following concerns we addressed today:    Problem List Items Addressed This Visit       26 weeks gestation of pregnancy ([facility])    Overview   31 yo     1st Trimester  [x]  OB profile/lab work 25 WNL, RUBELLA NON IMMUNE, BLOOD TYPE B+  [x]  Pap 3-28-24 NEG  [x]  GC DNA probe 25 NEG/NEG  [x]  Urine culture 25 NEG  [x]  Early A1c (BMI 30+) 25 5.4%  [x]  Aneuploidy screening (Prequel 10+ wk/First Trimester Check 11-14 wk) 25 PREQUEL RRNIPT  [x]  Carrier screening 25 FORESIGHT NEG X3  []  First trimester anatomy US/NT DECLINES  []  Aspirin prophylaxis Discussed     2nd Trimester  [x]  Anatomy US (19-21 wks) 2025 US 20 4/7 wks S=D, posterior placenta, Incomplete anatomic survey, recommend repeat.  7- completion of anatomic views, normal.   []  1 hour Glucose (25-28 wks)  []  CBC (25-28 wks)  []  3 hour GTT (if needed)    3rd Trimester  []  GBS (36-37 wks)  []  L&D consent  []  TOLAC consent (if needed)  []  Medicaid salpingectomy consent    Vaccines  []  COVID yes and boosters  []  Flu (September to May)  []  Tdap (27-36 wks) plans to do  []  RSV (32-36 wks Sept-)             Relevant Orders    POCT UA (nonautomated) manually resulted (Completed)   Patient will do PT exercises. Consider maternity band.    Will do TDAP next visit  RTC in 4 weeks      Stella Smith MD

## 2025-08-04 ENCOUNTER — LAB (OUTPATIENT)
Dept: LAB | Facility: HOSPITAL | Age: 30
End: 2025-08-04
Payer: COMMERCIAL

## 2025-08-04 ENCOUNTER — APPOINTMENT (OUTPATIENT)
Dept: OBSTETRICS AND GYNECOLOGY | Facility: CLINIC | Age: 30
End: 2025-08-04
Payer: COMMERCIAL

## 2025-08-04 VITALS — DIASTOLIC BLOOD PRESSURE: 76 MMHG | SYSTOLIC BLOOD PRESSURE: 118 MMHG | BODY MASS INDEX: 28.25 KG/M2 | WEIGHT: 175 LBS

## 2025-08-04 DIAGNOSIS — Z34.00 ENCOUNTER FOR SUPERVISION OF NORMAL FIRST PREGNANCY, UNSPECIFIED TRIMESTER (HHS-HCC): Primary | ICD-10-CM

## 2025-08-04 DIAGNOSIS — Z3A.26 26 WEEKS GESTATION OF PREGNANCY (HHS-HCC): ICD-10-CM

## 2025-08-04 LAB
ERYTHROCYTE [DISTWIDTH] IN BLOOD BY AUTOMATED COUNT: 12.8 % (ref 11.5–14.5)
HCT VFR BLD AUTO: 36.5 % (ref 36–46)
HGB BLD-MCNC: 12 G/DL (ref 12–16)
MCH RBC QN AUTO: 31.2 PG (ref 26–34)
MCHC RBC AUTO-ENTMCNC: 32.9 G/DL (ref 32–36)
MCV RBC AUTO: 95 FL (ref 80–100)
NRBC BLD-RTO: 0 /100 WBCS (ref 0–0)
PLATELET # BLD AUTO: 180 X10*3/UL (ref 150–450)
POC BLOOD, URINE: NEGATIVE
POC GLUCOSE, URINE: NEGATIVE MG/DL
POC KETONES, URINE: NEGATIVE MG/DL
POC LEUKOCYTES, URINE: ABNORMAL
POC NITRITE,URINE: NEGATIVE
POC PROTEIN, URINE: NEGATIVE MG/DL
RBC # BLD AUTO: 3.85 X10*6/UL (ref 4–5.2)
REFLEX ADDED, ANEMIA PANEL: NORMAL
WBC # BLD AUTO: 9.8 X10*3/UL (ref 4.4–11.3)

## 2025-08-04 PROCEDURE — 0501F PRENATAL FLOW SHEET: CPT | Performed by: OBSTETRICS & GYNECOLOGY

## 2025-08-04 PROCEDURE — 36415 COLL VENOUS BLD VENIPUNCTURE: CPT

## 2025-08-04 PROCEDURE — 85027 COMPLETE CBC AUTOMATED: CPT

## 2025-08-04 RX ORDER — ASPIRIN 81 MG/1
81 TABLET ORAL DAILY
COMMUNITY

## 2025-08-04 NOTE — PATIENT INSTRUCTIONS
You were seen in the office today for routine OB care   Continue routine OB precautions at home  Continue taking prenatal vitamins   Avoid sick contacts and consider getting your Flu (available in office during flu season) and COVID vaccines to protect against infection in pregnancy  You will be given an order for your anatomy ultrasound. Please schedule at [facility] OB imaging between 19 and 22 weeks gestation [phone]  You will be given a bottle of Glucola and orders for your second trimester labs. Please complete these between 25 and 28 weeks gestation. You may complete these at any  outpatient lab, and do not need an appointment  Make an appointment for routine care in the office in the next 4 weeks  If you are having any concerns prior to your next visit please call the office to speak to the physician on call. This includes after hours, weekends, and holidays, when the answering service will be able to connect you with the physician on call. [phone] ([place] Office) or [phone] [place] [facility].

## 2025-08-05 LAB — GLUCOSE 1H P 50 G GLC PO SERPL-MCNC: 118 MG/DL

## 2025-09-05 ENCOUNTER — APPOINTMENT (OUTPATIENT)
Dept: OBSTETRICS AND GYNECOLOGY | Facility: CLINIC | Age: 30
End: 2025-09-05
Payer: COMMERCIAL

## 2025-09-05 PROBLEM — Z3A.30 30 WEEKS GESTATION OF PREGNANCY (HHS-HCC): Status: ACTIVE | Noted: 2025-04-21

## 2025-09-05 PROBLEM — Z23 NEED FOR TDAP VACCINATION: Status: ACTIVE | Noted: 2025-09-05

## 2025-09-22 ENCOUNTER — APPOINTMENT (OUTPATIENT)
Dept: OBSTETRICS AND GYNECOLOGY | Facility: CLINIC | Age: 30
End: 2025-09-22
Payer: COMMERCIAL

## 2025-09-23 ENCOUNTER — APPOINTMENT (OUTPATIENT)
Dept: OBSTETRICS AND GYNECOLOGY | Facility: CLINIC | Age: 30
End: 2025-09-23
Payer: COMMERCIAL